# Patient Record
Sex: MALE | Race: WHITE | NOT HISPANIC OR LATINO | Employment: FULL TIME | URBAN - METROPOLITAN AREA
[De-identification: names, ages, dates, MRNs, and addresses within clinical notes are randomized per-mention and may not be internally consistent; named-entity substitution may affect disease eponyms.]

---

## 2018-10-19 ENCOUNTER — OFFICE VISIT (OUTPATIENT)
Dept: FAMILY MEDICINE | Facility: CLINIC | Age: 33
End: 2018-10-19

## 2018-10-19 VITALS
OXYGEN SATURATION: 97 % | HEART RATE: 51 BPM | BODY MASS INDEX: 26.55 KG/M2 | RESPIRATION RATE: 16 BRPM | HEIGHT: 72 IN | TEMPERATURE: 98 F | DIASTOLIC BLOOD PRESSURE: 70 MMHG | SYSTOLIC BLOOD PRESSURE: 108 MMHG | WEIGHT: 196 LBS

## 2018-10-19 DIAGNOSIS — Z13.1 SCREENING FOR DIABETES MELLITUS: ICD-10-CM

## 2018-10-19 DIAGNOSIS — Z13.220 SCREENING CHOLESTEROL LEVEL: ICD-10-CM

## 2018-10-19 DIAGNOSIS — Z00.01 ENCOUNTER FOR ROUTINE ADULT HEALTH EXAMINATION WITH ABNORMAL FINDINGS: Primary | ICD-10-CM

## 2018-10-19 DIAGNOSIS — Z30.9 ENCOUNTER FOR CONTRACEPTIVE MANAGEMENT, UNSPECIFIED TYPE: ICD-10-CM

## 2018-10-19 DIAGNOSIS — Z02.9 ADMINISTRATIVE ENCOUNTER: ICD-10-CM

## 2018-10-19 DIAGNOSIS — Z71.89 ACP (ADVANCE CARE PLANNING): ICD-10-CM

## 2018-10-19 DIAGNOSIS — Z76.89 HEALTH CARE HOME: ICD-10-CM

## 2018-10-19 LAB
GLUCOSE SERPL-MCNC: 86 MG/DL (ref 60–99)
HEMOGLOBIN: 16.1 G/DL (ref 13.3–17.7)

## 2018-10-19 PROCEDURE — 80061 LIPID PANEL: CPT | Mod: 90 | Performed by: FAMILY MEDICINE

## 2018-10-19 PROCEDURE — 82947 ASSAY GLUCOSE BLOOD QUANT: CPT | Performed by: FAMILY MEDICINE

## 2018-10-19 PROCEDURE — 85018 HEMOGLOBIN: CPT | Performed by: FAMILY MEDICINE

## 2018-10-19 PROCEDURE — 99385 PREV VISIT NEW AGE 18-39: CPT | Performed by: FAMILY MEDICINE

## 2018-10-19 PROCEDURE — 36415 COLL VENOUS BLD VENIPUNCTURE: CPT | Performed by: FAMILY MEDICINE

## 2018-10-19 NOTE — NURSING NOTE
Patient is here for a full physical exam. Patient was given healthcare directive today to fill out and turn in when done so we have on file.     Pre-Visit Screening :  Immunizations : up to date  Colon Screening : NA  Mammogram: NA  Asthma Action Test/Plan : No concerns  PHQ9 :  PHQ-2 done today   GAD7 :  No concerns  Patient's  BMI Body mass index is 26.96 kg/(m^2).  Questioned patient about current smoking habits.  Pt. has never smoked.  OK to leave a detailed voice message regarding today's visit Yes, phone # [phone]      ETOH screening:  Questions:  1-How often do you have a drink containing alcohol?                             2 times per week(s)  2-How many drinks containing alcohol do you have on a typical day when you are         Drinking?                              1 and 2   3- How often do you have 5 or more drinks on one occasion?                            Two times  per year

## 2018-10-19 NOTE — PROGRESS NOTES
"SUBJECTIVE:  The patient is in for an annual physical.  Patient is complaining of the following:  Needing an administrative exam for work incentives    PAST MEDICAL HISTORY:  Past Medical History:  No date: Broken collarbone  No date: Torn Achilles tendon    CURRENT MEDICATIONS:  No current outpatient prescriptions on file.    DRUG ALLERGIES:  Review of patient's allergies indicates no known allergies.    FAMILY HISTORY:    Family History    Skin Cancer Mother        SOCIAL HISTORY:  Social History   Marital status:   Spouse name: N/A    Years of education: N/A  Number of children: N/A   Occupational History  None on file   Social History Main Topics   Smoking status: Never Smoker    Smokeless tobacco: Never Used    Alcohol use Yes    Comment: occasional     Drug use: No    Sexual activity: Yes    Partners: Female    Birth control/ protection: None   Other Topics Concern   None on file   Social History Narrative       IMMUNIZATIONS:    Immunization History  Administered            Date(s) Administered   Influenza Intranasal Vaccine 4 valent                         10/11/2018     Tdap (Adacel,Boostrix)                         01/28/2010      REVIEW OF SYSTEMS:   Ears/Nose/Throat: negative   Eyes: Negative   Respiratory: negative   Cardiovascular: negative   Gastrointestinal: negative   Genitourinary: negative   Musculoskeletal: multiple injuries/ recent groin strain. A regular at [employer]     Neurologic: negative    Skin: negative    Psychiatric: excessive stress-work and 3 children/ youngest 2 weeks old    Hematologic/Lymphatic/Immunologic:  negative     Endocrine:  negative     OBJECTIVE:  VITALS:  /70 (BP Location: Left arm, Patient Position: Sitting, Cuff Size: Adult Regular)  Pulse 51  Temp 98  F (36.7  C) (Oral)  Ht 1.816 m (5' 11.5\")  Wt 88.9 kg (196 lb)  SpO2 97%  BMI 26.96 kg/m2  In general, this is a well developed, well nourished appearing male. Healthy.  HEENT:  Unremarkable; fundi are " within normal limits.  NECK: Supple; no adenopathy or thyromegaly.   LUNGS:  Clear to auscultation bilaterally.  HEART:  Regular rhythm and rate, normal S1 and S2, no evidence of a murmur.  ABDOMEN:  Bowel sounds are present throughout. The abdomen is soft and nontender, no hepatosplenomegaly or masses appreciated.  GENITALIA:  Appears to be normal male with bilaterally descended testes and a circumcised penis. Negative hernia  NEUROLOGICAL:  The exam is nonfocal. Deep tendon reflexes are symmetric  SKIN: Fair with freckles.    LAB: See Orders      ASSESSMENT:  (Z00.01) Encounter for routine adult health examination with abnormal findings  (primary encounter diagnosis)  Plan: VENIPUNC FNGR,HEEL,EAR [75668], HEMOGLOBIN         [67813.000]        Exercise encouraged  Fasting lipid profile obtained  Monitor size and characteristics of moles  Seat belt use encouraged  Sunscreen recommended  Testicular self-exam encouraged      (Z02.9) Administrative encounter  Plan: VENIPUNC FNGR,HEEL,EAR [55463], LIPID PANEL,         Glucose Fasting (BFP)        See form    (Z30.9) Encounter for contraceptive management, unspecified type  Plan: UROLOGY ADULT REFERRAL        contact Urology for vasectomy referral    (Z13.1) Screening for diabetes mellitus  Plan: VENIPUNC FNGR,HEEL,EAR [95118], Glucose Fasting        (BFP)            (Z13.220) Screening cholesterol leve  Plan: VENIPUNC FNGR,HEEL,EAR [02376], LIPID PANEL            (Z76.89) Health Care Home  Plan: I have reviewed the patient's medical history in detail and updated the computerized patient record.      (Z71.89) ACP (advance care planning)  Plan: I have reviewed the patient's medical history in detail and updated the computerized patient record.

## 2018-10-19 NOTE — MR AVS SNAPSHOT
After Visit Summary   10/19/2018    Zach Little    MRN: [id number]           Patient Information     Date Of Birth          1985        Visit Information        Provider Department      10/19/2018 11:30 AM Scherf, Anita E, MD [facility]        Today's Diagnoses     Encounter for routine adult health examination with abnormal findings    -  1    Administrative encounter        Encounter for contraceptive management, unspecified type        Screening for diabetes mellitus        Screening cholesterol level        Health Care Home        ACP (advance care planning)          Care Instructions    Exercise encouraged  Fasting lipid profile obtained  Monitor size and characteristics of moles  Seat belt use encouraged  Sunscreen recommended  Testicular self-exam encouraged            Follow-ups after your visit        Additional Services     UROLOGY ADULT REFERRAL       Your provider has referred you to: [facility]: [facility] [place] [phone]   [email or web address]    Please be aware that coverage of these services is subject to the terms and limitations of your health insurance plan.  Call member services at your health plan with any benefit or coverage questions.      Please bring the following with you to your appointment:    (1) Any X-Rays, CTs or MRIs which have been performed.  Contact the facility where they were done to arrange for  prior to your scheduled appointment.    (2) List of current medications  (3) This referral request   (4) Any documents/labs given to you for this referral                  Follow-up notes from your care team     Return in about 1 year (around 10/19/2019), or if symptoms worsen or fail to improve.      Who to contact     If you have questions or need follow up information about today's clinic visit or your schedule please contact [facility] directly at  "[phone].  Normal or non-critical lab and imaging results will be communicated to you by MyChart, letter or phone within 4 business days after the clinic has received the results. If you do not hear from us within 7 days, please contact the clinic through Leversensehart or phone. If you have a critical or abnormal lab result, we will notify you by phone as soon as possible.  Submit refill requests through Netechy or call your pharmacy and they will forward the refill request to us. Please allow 3 business days for your refill to be completed.          Additional Information About Your Visit        Leversensehart Information     [employer]y gives you secure access to your electronic health record. If you see a primary care provider, you can also send messages to your care team and make appointments. If you have questions, please call your primary care clinic.  If you do not have a primary care provider, please call [phone] and they will assist you.        Care EveryWhere ID     This is your Care EveryWhere ID. This could be used by other organizations to access your [facility] medical records  [id number]        Your Vitals Were     Pulse Temperature Respirations Height Pulse Oximetry BMI (Body Mass Index)    51 98  F (36.7  C) (Oral) 16 1.816 m (5' 11.5\") 97% 26.96 kg/m2       Blood Pressure from Last 3 Encounters:   10/19/18 108/70   08/25/16 100/68   01/29/16 94/58    Weight from Last 3 Encounters:   10/19/18 88.9 kg (196 lb)   08/25/16 88.9 kg (196 lb)   01/29/16 89.8 kg (198 lb)              We Performed the Following     Glucose Fasting (BFP)     HEMOGLOBIN [54712.000]     LIPID PANEL     UROLOGY ADULT REFERRAL     VENIPUNC FNGR,HEEL,EAR [[id number]]        Primary Care Provider Office Phone # Fax #    Anita Rodriguez [phone]       [address]  [address] [place] [address]        Equal Access to Services     BRISA PETERSON AH: Hadii aad ku hadasho Soomaali, waaxda luqadaha, qaybta stevenaldaisha mcadams, " joni sweettyler boone'aan ah. So [facility] [phone].    ATENCIÓN: Si habla español, tiene a garrett disposición servicios gratuitos de asistencia lingüística. Llstephan al [phone].    We comply with applicable federal civil rights laws and [place] laws. We do not discriminate on the basis of race, color, national origin, age, disability, sex, sexual orientation, or gender identity.            Thank you!     Thank you for choosing [facility]  for your care. Our goal is always to provide you with excellent care. Hearing back from our patients is one way we can continue to improve our services. Please take a few minutes to complete the written survey that you may receive in the mail after your visit with us. Thank you!             Your Updated Medication List - Protect others around you: Learn how to safely use, store and throw away your medicines at www.disposemymeds.org.      Notice  As of 10/19/2018 12:31 PM    You have not been prescribed any medications.

## 2018-10-19 NOTE — PATIENT INSTRUCTIONS
Exercise encouraged  Fasting lipid profile obtained  Monitor size and characteristics of moles  Seat belt use encouraged  Sunscreen recommended  Testicular self-exam encouraged    Welcome

## 2018-10-20 LAB
CHOLEST SERPL-MCNC: 219 MG/DL
CHOLEST/HDLC SERPL: 4.5 (CALC)
HDLC SERPL-MCNC: 49 MG/DL
LDLC SERPL CALC-MCNC: 155 MG/DL (CALC)
NONHDLC SERPL-MCNC: 170 MG/DL (CALC)
TRIGL SERPL-MCNC: 62 MG/DL

## 2018-11-09 ENCOUNTER — OFFICE VISIT (OUTPATIENT)
Dept: UROLOGY | Facility: CLINIC | Age: 33
End: 2018-11-09
Payer: COMMERCIAL

## 2018-11-09 VITALS
WEIGHT: 195 LBS | DIASTOLIC BLOOD PRESSURE: 68 MMHG | SYSTOLIC BLOOD PRESSURE: 104 MMHG | HEIGHT: 72 IN | BODY MASS INDEX: 26.41 KG/M2

## 2018-11-09 DIAGNOSIS — Z30.2 ENCOUNTER FOR STERILIZATION: Primary | ICD-10-CM

## 2018-11-09 PROCEDURE — 99202 OFFICE O/P NEW SF 15 MIN: CPT | Performed by: UROLOGY

## 2018-11-09 ASSESSMENT — PAIN SCALES - GENERAL: PAINLEVEL: NO PAIN (0)

## 2018-11-09 NOTE — MR AVS SNAPSHOT
After Visit Summary   11/9/2018    Zach Little    MRN: [id number]           Patient Information     Date Of Birth          1985        Visit Information        Provider Department      11/9/2018 8:00 AM Lul Cancino MD [facility] [place]        Care Instructions    [facility]  Vasectomy Information  [phone]    DATE OF PROCEDURE __________12/14_________________________    TIME OF PROCEDURE _________9:00__________________    TIME TO REPORT FOR PROCEDURE ____________8:45___________________    Your Physician:  _____ Isabel Mackay M.D.     _____ Terrence Luna M.D.     __x___ Lul Cancino M.D.    LOCATION OF PROCEDURE:     __x___ [facility]  _____ [address]   [address] [facility]  [address]    [place]   [address]    [place]   [address]    Preoperative Instructions:    _x____ You may have breakfast on the morning of your procedure.  If your procedure is    in the afternoon, you may have lunch as well.    _____ You must have someone drive you home after the procedure if you have been    prescribed an oral sedative (valium).    __x___ Do not take any aspirin, blood-thinning or anti-inflammatory medication for at    least 7-10 days before the procedure (this includes but is not limited to Advil,   Aleve, Ecotrin and Bufferin).      Postoperative Instructions Follow Vasectomy    Under routine circumstances, please note the following:    -No heavy lifting (over 15 lbs) for 48 hour.  -You may shower after 48 hours.  You may have a tub bath or use a swimming pool after one week postoperatively.  Your doctor will advise you if he feels it is helpful to soak in a bathtub postoperatively.  -Do not engage in intercourse for at least ten days and then proceed when comfortable.  -Wear an athletic supporter for 48 hours postoperatively or until any discomfort ceases.  -Your physician  "will instruct you regarding the use of ice in the recovery room or at home after the procedure, as necessary.  -Please remember as you resume your activity that you may experience some discomfor and/or swelling for the one to two weeks following the procedure.  If this occurs decrease activity and slightly elevate the scrotum (athletic supporter).  -As your stitches dissolve it may appear that the incision is \"gaping.\"  This is normal.    Necessary follow-up:  You do not need a follow up appointment unless you have problems after your procedure.  Please call our office at [phone] if you do.    At the time of your procedure you will be given the supplies for your post procedure follow up.  The test should be done AT LEAST THREE MONTHS AFTER your vasecomy.  During this time, be certain to maintain birth control measure!    Between the time of your procedure and the time that you have your first sperm count it is very important that you have a minimum of 30 ejaculations.  If you have any questions about this, please consult your physician.    If the sperm count that is done at three months is negative, you will be considered sterile.  Until, you are told that you are free to discontinue birth control you are considered fertile.    If your sperm counts reveal the presence of sperm, you will be advised to repeat the test until a negative result is obtained.     NOTE:  Please call our office one week after dropping off your sample for the result.  Test results will only be given to the patient.               Follow-ups after your visit        Your next 10 appointments already scheduled     Dec 14, 2018  9:00 AM CST   (Arrive by 8:45 AM)   Vasectomy with Lul Cancino MD,  CAUT EQ, UA VAS ROOM   [facility] ([facility])    [address]  [address]  [facility] [id number] [phone]              Who to contact     If you have questions or need " follow up information about today's clinic visit or your schedule please contact [facility] directly at [phone].  Normal or non-critical lab and imaging results will be communicated to you by MyChart, letter or phone within 4 business days after the clinic has received the results. If you do not hear from us within 7 days, please contact the clinic through MyChart or phone. If you have a critical or abnormal lab result, we will notify you by phone as soon as possible.  Submit refill requests through Everfi or call your pharmacy and they will forward the refill request to us. Please allow 3 business days for your refill to be completed.          Additional Information About Your Visit        [employer]Vis[employer]ard Information     Everfi gives you secure access to your electronic health record. If you see a primary care provider, you can also send messages to your care team and make appointments. If you have questions, please call your primary care clinic.  If you do not have a primary care provider, please call [phone] and they will assist you.        Care EveryWhere ID     This is your Care EveryWhere ID. This could be used by other organizations to access your [facility] medical records  [id number]        Your Vitals Were     Height BMI (Body Mass Index)                1.829 m (6') 26.45 kg/m2           Blood Pressure from Last 3 Encounters:   11/09/18 104/68   10/19/18 108/70   08/25/16 100/68    Weight from Last 3 Encounters:   11/09/18 88.5 kg (195 lb)   10/19/18 88.9 kg (196 lb)   08/25/16 88.9 kg (196 lb)              Today, you had the following     No orders found for display       Primary Care Provider Office Phone # Fax #    Anita Rodriguez [phone]       [facility] [address] [address]        Equal Access to Services     BRISA PETERSON : Jluis Carmen, rob newsome, florentin mcadams, joni redman  misha chaseaanicole ah. So [facility] [phone].    ATENCIÓN: Si habla armando, tiene a garrett disposición servicios gratuitos de asistencia lingüística. Llstephan al [phone].    We comply with applicable federal civil rights laws and [place] laws. We do not discriminate on the basis of race, color, national origin, age, disability, sex, sexual orientation, or gender identity.            Thank you!     Thank you for choosing [facility] [place]  for your care. Our goal is always to provide you with excellent care. Hearing back from our patients is one way we can continue to improve our services. Please take a few minutes to complete the written survey that you may receive in the mail after your visit with us. Thank you!             Your Updated Medication List - Protect others around you: Learn how to safely use, store and throw away your medicines at www.disposemymeds.org.      Notice  As of 11/9/2018  8:25 AM    You have not been prescribed any medications.

## 2018-11-09 NOTE — LETTER
11/9/2018       RE: Zach Little  [address] [address] [facility] [address]     Dear Colleague,    Thank you for referring your patient, Zach Little, to the [facility] [place] at [facility]. Please see a copy of my visit note below.    VASECTOMY CONSULTATION NOTE  DATE OF VISIT: 11/9/2018    PATIENT NAME: Zach Little    YOB: 1985      REASON FOR CONSULTATION: Mr. Zach Little is a 33 year old year old gentleman who came to the urology clinic today requesting a vasectomy. He has 3 children and he wishes to have a vasectomy for birth control. Ages 3, 2 and 6 weeks.    PAST MEDICAL HISTORY:   Past Medical History:   Diagnosis Date     Broken collarbone      Torn Achilles tendon        PAST SURGICAL HISTORY:   Past Surgical History:   Procedure Laterality Date     DENTAL SURGERY  2014    apicoectomy - root canal        MEDICATIONS: No current outpatient prescriptions on file.    ALLERGIES: No Known Allergies    FAMILY HISTORY:   Family History   Problem Relation Age of Onset     Skin Cancer Mother      basal cell carcinoma     Cancer Maternal Grandmother      uterine     Cancer Maternal Grandfather      basal cell carcinoma     Dementia Paternal Grandmother      Luey body     Breast Cancer Paternal Grandmother      Cerebrovascular Disease Paternal Grandfather      Lymphoma Paternal Grandfather        SOCIAL HISTORY:   Social History     Social History     Marital status:      Spouse name: N/A     Number of children: 3     Years of education: N/A     Occupational History     Not on file.     Social History Main Topics     Smoking status: Never Smoker     Smokeless tobacco: Never Used     Alcohol use Yes      Comment: occasional      Drug use: No     Sexual activity: Yes     Partners: Female     Birth control/ protection: None     Other Topics Concern     Not on file     Social History Narrative   Works in finance    HEIGHT:  "6' 0\"     WEIGHT: 195 lbs 0 oz   BP: 104/68    PULSE: Data Unavailable    EXAM: He is alert and oriented and well-appearing.  Examination of the scrotum reveals normal scrotal skin.  The testicles are normal to palpation bilaterally with no intratesticular lesions.  He has normally palpable vasa bilaterally.    DIAGNOSIS: Request for sterilization    PLAN: The risks of the procedure as well as expectations for recovery and outcomes were explained in detail to him.  He was counseled on the risks for bleeding infection and pain after the procedure. We discussed the risk of post-vasectomy pain syndrome.  He was instructed to continue to use contraception until he had proven azoospermia on a semen specimen.  This would normally be collected at least 3 months after the procedure. Also discussed the rare, but possible risk of re-canalization of the vas, even after successful vasectomy with sterile semen specimen.  He was instructed to hold all anticoagulants medications for one week prior to the procedure.  It was recommended that he have someone else drive him home after his vasectomy.  In light of these risks and expectations he would like to proceed.  We are scheduling a vasectomy in the office in the near future.    Lul Cancino MD  Urology  [facility]  Clinic Phone [phone]    Answers for HPI/ROS submitted by the patient on 10/29/2018   General Symptoms: No  Skin Symptoms: No  HENT Symptoms: No  EYE SYMPTOMS: No  HEART SYMPTOMS: No  LUNG SYMPTOMS: No  INTESTINAL SYMPTOMS: No  URINARY SYMPTOMS: No  REPRODUCTIVE SYMPTOMS: No  SKELETAL SYMPTOMS: No  BLOOD SYMPTOMS: No  NERVOUS SYSTEM SYMPTOMS: No  MENTAL HEALTH SYMPTOMS: No          "

## 2018-11-09 NOTE — PROGRESS NOTES
"VASECTOMY CONSULTATION NOTE  DATE OF VISIT: 11/9/2018    PATIENT NAME: Zach Little    YOB: 1985      REASON FOR CONSULTATION: Mr. Zach Little is a 33 year old year old gentleman who came to the urology clinic today requesting a vasectomy. He has 3 children and he wishes to have a vasectomy for birth control. Ages 3, 2 and 6 weeks.    PAST MEDICAL HISTORY:   Past Medical History:   Diagnosis Date     Broken collarbone      Torn Achilles tendon        PAST SURGICAL HISTORY:   Past Surgical History:   Procedure Laterality Date     DENTAL SURGERY  2014    apicoectomy - root canal        MEDICATIONS: No current outpatient prescriptions on file.    ALLERGIES: No Known Allergies    FAMILY HISTORY:   Family History   Problem Relation Age of Onset     Skin Cancer Mother      basal cell carcinoma     Cancer Maternal Grandmother      uterine     Cancer Maternal Grandfather      basal cell carcinoma     Dementia Paternal Grandmother      Luey body     Breast Cancer Paternal Grandmother      Cerebrovascular Disease Paternal Grandfather      Lymphoma Paternal Grandfather        SOCIAL HISTORY:   Social History     Social History     Marital status:      Spouse name: N/A     Number of children: 3     Years of education: N/A     Occupational History     Not on file.     Social History Main Topics     Smoking status: Never Smoker     Smokeless tobacco: Never Used     Alcohol use Yes      Comment: occasional      Drug use: No     Sexual activity: Yes     Partners: Female     Birth control/ protection: None     Other Topics Concern     Not on file     Social History Narrative   Works in finance    HEIGHT: 6' 0\"     WEIGHT: 195 lbs 0 oz   BP: 104/68    PULSE: Data Unavailable    EXAM: He is alert and oriented and well-appearing.  Examination of the scrotum reveals normal scrotal skin.  The testicles are normal to palpation bilaterally with no intratesticular lesions.  He has normally palpable vasa " bilaterally.    DIAGNOSIS: Request for sterilization    PLAN: The risks of the procedure as well as expectations for recovery and outcomes were explained in detail to him.  He was counseled on the risks for bleeding infection and pain after the procedure. We discussed the risk of post-vasectomy pain syndrome.  He was instructed to continue to use contraception until he had proven azoospermia on a semen specimen.  This would normally be collected at least 3 months after the procedure. Also discussed the rare, but possible risk of re-canalization of the vas, even after successful vasectomy with sterile semen specimen.  He was instructed to hold all anticoagulants medications for one week prior to the procedure.  It was recommended that he have someone else drive him home after his vasectomy.  In light of these risks and expectations he would like to proceed.  We are scheduling a vasectomy in the office in the near future.    Lul Cancino MD  Urology  [facility]  Clinic Phone [phone]    Answers for HPI/ROS submitted by the patient on 10/29/2018   General Symptoms: No  Skin Symptoms: No  HENT Symptoms: No  EYE SYMPTOMS: No  HEART SYMPTOMS: No  LUNG SYMPTOMS: No  INTESTINAL SYMPTOMS: No  URINARY SYMPTOMS: No  REPRODUCTIVE SYMPTOMS: No  SKELETAL SYMPTOMS: No  BLOOD SYMPTOMS: No  NERVOUS SYSTEM SYMPTOMS: No  MENTAL HEALTH SYMPTOMS: No

## 2018-11-09 NOTE — PATIENT INSTRUCTIONS
[facility] UROLOGY  Vasectomy Information  [phone]    DATE OF PROCEDURE __________12/14_________________________    TIME OF PROCEDURE _________9:00__________________    TIME TO REPORT FOR PROCEDURE ____________8:45___________________    Your Physician:  _____ Isabel Mackay M.D.     _____ Terrence Luna M.D.     __x___ Lul Cancino M.D.    LOCATION OF PROCEDURE:     __x___ [facility]  _____ [address]   [address]   [address] [facility]  [address]    [place]   [address]    [place]   [address]    Preoperative Instructions:    _x____ You may have breakfast on the morning of your procedure.  If your procedure is    in the afternoon, you may have lunch as well.    _____ You must have someone drive you home after the procedure if you have been    prescribed an oral sedative (valium).    __x___ Do not take any aspirin, blood-thinning or anti-inflammatory medication for at    least 7-10 days before the procedure (this includes but is not limited to Advil,   Aleve, Ecotrin and Bufferin).      Postoperative Instructions Follow Vasectomy    Under routine circumstances, please note the following:    -No heavy lifting (over 15 lbs) for 48 hour.  -You may shower after 48 hours.  You may have a tub bath or use a swimming pool after one week postoperatively.  Your doctor will advise you if he feels it is helpful to soak in a bathtub postoperatively.  -Do not engage in intercourse for at least ten days and then proceed when comfortable.  -Wear an athletic supporter for 48 hours postoperatively or until any discomfort ceases.  -Your physician will instruct you regarding the use of ice in the recovery room or at home after the procedure, as necessary.  -Please remember as you resume your activity that you may experience some discomfor and/or swelling for the one to two weeks following the procedure.  If this occurs decrease activity and slightly elevate the scrotum (athletic  "supporter).  -As your stitches dissolve it may appear that the incision is \"gaping.\"  This is normal.    Necessary follow-up:  You do not need a follow up appointment unless you have problems after your procedure.  Please call our office at [phone] if you do.    At the time of your procedure you will be given the supplies for your post procedure follow up.  The test should be done AT LEAST THREE MONTHS AFTER your vasecomy.  During this time, be certain to maintain birth control measure!    Between the time of your procedure and the time that you have your first sperm count it is very important that you have a minimum of 30 ejaculations.  If you have any questions about this, please consult your physician.    If the sperm count that is done at three months is negative, you will be considered sterile.  Until, you are told that you are free to discontinue birth control you are considered fertile.    If your sperm counts reveal the presence of sperm, you will be advised to repeat the test until a negative result is obtained.     NOTE:  Please call our office one week after dropping off your sample for the result.  Test results will only be given to the patient.       "

## 2018-11-09 NOTE — NURSING NOTE
Chief Complaint   Patient presents with     Consult     New pt is here for vasectomy consult.      Quita Gloria, CMA

## 2018-12-14 ENCOUNTER — OFFICE VISIT (OUTPATIENT)
Dept: UROLOGY | Facility: CLINIC | Age: 33
End: 2018-12-14
Payer: COMMERCIAL

## 2018-12-14 VITALS
WEIGHT: 195 LBS | HEIGHT: 72 IN | OXYGEN SATURATION: 97 % | SYSTOLIC BLOOD PRESSURE: 108 MMHG | HEART RATE: 52 BPM | BODY MASS INDEX: 26.41 KG/M2 | DIASTOLIC BLOOD PRESSURE: 70 MMHG

## 2018-12-14 DIAGNOSIS — Z30.2 ENCOUNTER FOR STERILIZATION: Primary | ICD-10-CM

## 2018-12-14 PROCEDURE — 55250 REMOVAL OF SPERM DUCT(S): CPT | Performed by: UROLOGY

## 2018-12-14 PROCEDURE — 88302 TISSUE EXAM BY PATHOLOGIST: CPT | Performed by: UROLOGY

## 2018-12-14 RX ORDER — HYDROCODONE BITARTRATE AND ACETAMINOPHEN 5; 325 MG/1; MG/1
1 TABLET ORAL EVERY 6 HOURS PRN
Qty: 5 TABLET | Refills: 0 | Status: SHIPPED | OUTPATIENT
Start: 2018-12-14 | End: 2021-03-12

## 2018-12-14 RX ORDER — LIDOCAINE HYDROCHLORIDE 20 MG/ML
20 INJECTION, SOLUTION INFILTRATION; PERINEURAL ONCE
Status: DISCONTINUED | OUTPATIENT
Start: 2018-12-14 | End: 2021-10-25

## 2018-12-14 ASSESSMENT — MIFFLIN-ST. JEOR: SCORE: 1867.51

## 2018-12-14 ASSESSMENT — PAIN SCALES - GENERAL
PAINLEVEL: NO PAIN (0)
PAINLEVEL: NO PAIN (0)

## 2018-12-14 NOTE — PROGRESS NOTES
OFFICE VASECTOMY OPERATIVE NOTE    DATE: 12/14/18  PATIENT: Zach Little    YOB: 1985    Zach Little is a 33 year old male.  He has 3 children and he wishes a vasectomy for birth control.  He has read the brochure and he has shaved himself.  I reviewed the vasectomy procedure with him explaining that it would be done with a local anesthetic given just in the location where the vasectomy would be done.  It would be done through incisions with the removal of segments of the vasa, cauterization of the ends, and burying the ends separate with sutures.      Pt. Understands:  -1/1000-1/3000 risk of future pregnancy even with perfectly done vasectomy  -vasectomy is a permanent procedure    -he may cryopreserve sperm if he wishes   -1-5% risk of post-vasectomy pain syndrome   -1-5% risk of complication, primarily infection or bleeding  - he needs to have a semen sample that shows no sperm before getting approval for unprotected intercourse.      Complications such as bleeding, infection, and damage to other tissues in the area were discussed. I recommended that an ice bag be placed on the scrotum off and on tonight to help reduce pain and swelling.      He was reminded that he was not sterile immediately after the vasectomy that it would take at least 20 ejaculations to empty the vas of any remaining sperm.  He was not to provide a semen sample until after the 20th ejaculation and not before 12 weeks after the vas. He was  to fulfill both of those requirements.   He understands it is his responsibility to find out the results of the vas before proceeding with intercourse without birth control protection.  Other items discussed were activity afterwards, returning to work, voluntary physical activity,  resuming sexual activity, clothing to wear, bathing, and care of the vas site and expected changes in the site as healing progresses.  After signing the permit, bilateral vasectomy was done as described  below.     ANESTHESIA: Local    DETAILS OF PROCEDURE: The risks of the procedure were explained in detail to the patient and informed consent was obtained. The patient was placed supine on the procedure table and the penis and scrotum were prepped and draped in the standard sterile fashion. The right vas deferens was isolated and brought up to the skin. 1% lidocaine local anesthesia was used to infiltrate the skin and the spermatic cord. A small incision was created and adventitial tissues were swept away from the vas. A 1 cm segment of the vas was excised and sent for pathology. The proximal and distal lumina of the vas were cauterized and then each segment was tied off in a knuckling-fashion with a 3-0 vicryl suture. Hemostasis was ensured and the segments were released back into the scrotum. Meticulous hemostasis was achieved. Next the left vas was brought to the skin and a vasectomy was performed in the similar fashion. At the end of the procedure a single 3-0 chromic suture was placed in the skin.     COMPLICATIONS: None    TAKE HOME MEDICATIONS: Norco, 1-2 tabs every 6 hours, PRN    DISMISSAL INSTRUCTIONS:  - Ice pack to scrotum 15 to 20 minutes each hour awake for 36 to 40 hours.  - No strenuous activity or ejaculation for 14 days.  - No unprotected sexual activity until proven azoospermia on semen samples at 3 months.  - Referred to patient handout for normal postop expectations and indications to contact nurse or physician.    M.D.: Lul Cancino M.D.

## 2018-12-14 NOTE — PATIENT INSTRUCTIONS
POST VASECTOMY INSTRUCTIONS    1.) If you have any concerns or questions, please contact our office at [phone].     2.) It is okay to take a shower, however, do not soak in water (bath,swimming, hot tub,etc....) until your incision is healed.    3.) You might notice some swelling, mild bruising, and discomfort for several days after your vasectomy. This is to be expected. For at least the next 24 hours, an ice pack should be applied for 20 minutes every hour that you are awake. Ice will help with discomfort and swelling. Do not place directly on the skin.    4.) No intercourse, strenuous activity or exercise for at least 7-10 days, even if you feel fine.    5.) You need to wear good scrotal support while you are healing. We strongly recommend an athletic supporter or a pair of regular briefs that are one size too small. Boxer briefs do not offer enough support.    6.) Tylenol as directed on the bottle is preferred for discomfort. Please avoid any blood thinning products such as ibuprofen and aspirin (Motrin, Advil, Excedrin, Aleve, ect..) for at least the next week.    7.) It is normal to have mild drainage from the incision area for several days. However, please contact our office if you notice: bright red blood that does not stop after three days, increased pain, heat at the incision, red streaks, foul smelling discharge, or if you start to run a fever.     8.) YOU MUST CONTINUE BIRTH CONTROL UNTIL WE CONFIRM YOUR STERILITY.  This process can take up to a year to complete (rare occurrence).     9.) You have been given a form with specimen cup and instructions for your follow up specimen. You will be cleared once we receive ONE negative specimen. If your specimen comes back positive (sperm seen) you will be asked to repeat the test. This does not mean that your vasectomy has failed.

## 2018-12-14 NOTE — NURSING NOTE
Chief Complaint   Patient presents with     Sterilization     Pt here for in office vasectomy     The patient comes in today for a vasectomy.  The patient and his significant other have previously been in and we discussed the other options for birth control, the risks and benefits of the procedure.  Details of those risks and benefits have been noted in the consent form which has been signed.  This includes the potential for bleeding, potential for infection.  Data presented suggesting an increased risk of prostate carcinoma and potential for sperm granuloma formation.    The patient was placed in a supine position on the procedure table.  Prep was done by the patient at home.  Sterilely prepped and draped in the usual fashion.  The left vas was first identified and brought up to the midline raphae where a small wheal of Lidocaine with epinephrine was placed in the skin overlying the vas and further anesthesia was injected in to the vas sheath.  The no-scalpel dissecting instrument was then used to puncture the skin and dissect out the vas free of adventitial tissue.  The no-scalpel vas clamp was then used to grasp the vas.  Skin bleeders were cauterized with electrocautery as necessary.  When a segment of vas was clearly freed up, a .5-1 cm. segment of the vas was then removed and the proximal and distal ends were ligated with 4-0 Vicryl.  The distal end was then buried with pursestring suture.  Any bleeders were then cauterized and/or ligated to control hemorrhage.  When the sterile field was completely dry, it was returned to the scrotal sac.      Attention was then turned to the opposite side and proceeded in similar fashion to obtain specimen.  The vas was brought up to the same opening, injected with Lidocaine along the vas sheath and grasped with the clamp, and proceeded in the above fashion.  A 3-0 Chromic suture was used for the scrotal skin incision and a single interrupted suture was used to  re-approximate the skin edges.    ASSESSMENT:  1)  Male sterilization via vasectomy.     PLAN:  Patient tolerated the procedure quite well.  He will use Ibuprofen 800 mg. p.o. t.i.d. with food.  Ice to the scrotum 30 minutes at a time every two to three hours for the next two to three days.  No heavy lifting for three days.  The patient will bring in a sample of semen for analysis after 10-12 ejaculations.      Debbie Hamilton, CMA

## 2018-12-14 NOTE — LETTER
12/14/2018       RE: Zach Little  [address] [place] [address]     Dear Colleague,    Thank you for referring your patient, Zach Little, to the [facility] [place] at [facility]. Please see a copy of my visit note below.    OFFICE VASECTOMY OPERATIVE NOTE    DATE: 12/14/18  PATIENT: Zach Little    YOB: 1985    Zach Little is a 33 year old male.  He has 3 children and he wishes a vasectomy for birth control.  He has read the brochure and he has shaved himself.  I reviewed the vasectomy procedure with him explaining that it would be done with a local anesthetic given just in the location where the vasectomy would be done.  It would be done through incisions with the removal of segments of the vasa, cauterization of the ends, and burying the ends separate with sutures.      Pt. Understands:  -1/1000-1/3000 risk of future pregnancy even with perfectly done vasectomy  -vasectomy is a permanent procedure    -he may cryopreserve sperm if he wishes   -1-5% risk of post-vasectomy pain syndrome   -1-5% risk of complication, primarily infection or bleeding  - he needs to have a semen sample that shows no sperm before getting approval for unprotected intercourse.      Complications such as bleeding, infection, and damage to other tissues in the area were discussed. I recommended that an ice bag be placed on the scrotum off and on tonight to help reduce pain and swelling.      He was reminded that he was not sterile immediately after the vasectomy that it would take at least 20 ejaculations to empty the vas of any remaining sperm.  He was not to provide a semen sample until after the 20th ejaculation and not before 12 weeks after the vas. He was  to fulfill both of those requirements.   He understands it is his responsibility to find out the results of the vas before proceeding with intercourse without birth control  protection.  Other items discussed were activity afterwards, returning to work, voluntary physical activity,  resuming sexual activity, clothing to wear, bathing, and care of the vas site and expected changes in the site as healing progresses.  After signing the permit, bilateral vasectomy was done as described below.     ANESTHESIA: Local    DETAILS OF PROCEDURE: The risks of the procedure were explained in detail to the patient and informed consent was obtained. The patient was placed supine on the procedure table and the penis and scrotum were prepped and draped in the standard sterile fashion. The right vas deferens was isolated and brought up to the skin. 1% lidocaine local anesthesia was used to infiltrate the skin and the spermatic cord. A small incision was created and adventitial tissues were swept away from the vas. A 1 cm segment of the vas was excised and sent for pathology. The proximal and distal lumina of the vas were cauterized and then each segment was tied off in a knuckling-fashion with a 3-0 vicryl suture. Hemostasis was ensured and the segments were released back into the scrotum. Meticulous hemostasis was achieved. Next the left vas was brought to the skin and a vasectomy was performed in the similar fashion. At the end of the procedure a single 3-0 chromic suture was placed in the skin.     COMPLICATIONS: None    TAKE HOME MEDICATIONS: Norco, 1-2 tabs every 6 hours, PRN    DISMISSAL INSTRUCTIONS:  - Ice pack to scrotum 15 to 20 minutes each hour awake for 36 to 40 hours.  - No strenuous activity or ejaculation for 14 days.  - No unprotected sexual activity until proven azoospermia on semen samples at 3 months.  - Referred to patient handout for normal postop expectations and indications to contact nurse or physician.      M.D.: Lul Cancino M.D.

## 2018-12-17 LAB — COPATH REPORT: NORMAL

## 2019-06-10 DIAGNOSIS — Z30.2 ENCOUNTER FOR STERILIZATION: ICD-10-CM

## 2019-06-10 LAB — SEMEN ANALYSIS P VAS PNL: NORMAL

## 2019-06-10 PROCEDURE — 89321 SEMEN ANAL SPERM DETECTION: CPT | Performed by: UROLOGY

## 2019-06-11 ENCOUNTER — TELEPHONE (OUTPATIENT)
Dept: UROLOGY | Facility: CLINIC | Age: 34
End: 2019-06-11

## 2019-06-11 NOTE — TELEPHONE ENCOUNTER
Lul Cancino MD [facility] Nurse-Tyesha             Please call Zach and let him know there were no sperm seen on his semen analysis.     Thanks!   Will    Called and gave him the results.  [facility]  June 11, 2019 10:32 AM

## 2020-03-10 ENCOUNTER — HEALTH MAINTENANCE LETTER (OUTPATIENT)
Age: 35
End: 2020-03-10

## 2020-08-19 ENCOUNTER — NURSE TRIAGE (OUTPATIENT)
Dept: NURSING | Facility: CLINIC | Age: 35
End: 2020-08-19

## 2020-08-19 NOTE — TELEPHONE ENCOUNTER
Caller called to find out about the result of COVID-19 antibody test that was done.  Amy Sanabria RN    Additional Information    Negative: [1] Caller is not with the adult (patient) AND [2] reporting urgent symptoms    Negative: Lab result questions    Negative: Medication questions    Negative: Caller can't be reached by phone    Negative: Caller has already spoken to PCP or another triager    Negative: RN needs further essential information from caller in order to complete triage    Negative: Requesting regular office appointment    Negative: [1] Caller requesting NON-URGENT health information AND [2] PCP's office is the best resource    Negative: Health Information question, no triage required and triager able to answer question    General information question, no triage required and triager able to answer question    Protocols used: INFORMATION ONLY CALL-A-

## 2020-12-27 ENCOUNTER — HEALTH MAINTENANCE LETTER (OUTPATIENT)
Age: 35
End: 2020-12-27

## 2021-03-12 ENCOUNTER — OFFICE VISIT (OUTPATIENT)
Dept: FAMILY MEDICINE | Facility: CLINIC | Age: 36
End: 2021-03-12

## 2021-03-12 VITALS
RESPIRATION RATE: 20 BRPM | OXYGEN SATURATION: 97 % | HEART RATE: 54 BPM | SYSTOLIC BLOOD PRESSURE: 122 MMHG | HEIGHT: 72 IN | BODY MASS INDEX: 26.14 KG/M2 | DIASTOLIC BLOOD PRESSURE: 70 MMHG | WEIGHT: 193 LBS | TEMPERATURE: 97.9 F

## 2021-03-12 DIAGNOSIS — R07.0 THROAT PAIN: Primary | ICD-10-CM

## 2021-03-12 LAB — S PYO AG THROAT QL IA.RAPID: NORMAL

## 2021-03-12 PROCEDURE — 87880 STREP A ASSAY W/OPTIC: CPT | Performed by: FAMILY MEDICINE

## 2021-03-12 PROCEDURE — 87070 CULTURE OTHR SPECIMN AEROBIC: CPT | Performed by: FAMILY MEDICINE

## 2021-03-12 PROCEDURE — 99213 OFFICE O/P EST LOW 20 MIN: CPT | Performed by: FAMILY MEDICINE

## 2021-03-12 SDOH — ECONOMIC STABILITY: INCOME INSECURITY: HOW HARD IS IT FOR YOU TO PAY FOR THE VERY BASICS LIKE FOOD, HOUSING, MEDICAL CARE, AND HEATING?: NOT HARD AT ALL

## 2021-03-12 SDOH — ECONOMIC STABILITY: TRANSPORTATION INSECURITY
IN THE PAST 12 MONTHS, HAS THE LACK OF TRANSPORTATION KEPT YOU FROM MEDICAL APPOINTMENTS OR FROM GETTING MEDICATIONS?: NO

## 2021-03-12 SDOH — ECONOMIC STABILITY: TRANSPORTATION INSECURITY
IN THE PAST 12 MONTHS, HAS LACK OF TRANSPORTATION KEPT YOU FROM MEETINGS, WORK, OR FROM GETTING THINGS NEEDED FOR DAILY LIVING?: NO

## 2021-03-12 SDOH — ECONOMIC STABILITY: FOOD INSECURITY: WITHIN THE PAST 12 MONTHS, YOU WORRIED THAT YOUR FOOD WOULD RUN OUT BEFORE YOU GOT MONEY TO BUY MORE.: NEVER TRUE

## 2021-03-12 SDOH — ECONOMIC STABILITY: FOOD INSECURITY: WITHIN THE PAST 12 MONTHS, THE FOOD YOU BOUGHT JUST DIDN'T LAST AND YOU DIDN'T HAVE MONEY TO GET MORE.: NEVER TRUE

## 2021-03-12 ASSESSMENT — MIFFLIN-ST. JEOR: SCORE: 1848.44

## 2021-03-12 NOTE — PROGRESS NOTES
SUBJECTIVE:  Zach Little is an 35 year old male who presents for evaluation and treatment   of sore throat. Symptoms include sore throat. Onset 4   days, waxing and waning since that time. Known Strep exposure:   none and wife also has a sore throat.  exposed to young nieces and nephews with colds this past weekend  No fever, congestion, change in taste or smell    No current outpatient medications on file.     Current Facility-Administered Medications   Medication     lidocaine 2% injection (MDV)     No Known Allergies    Social History     Tobacco Use     Smoking status: Never Smoker     Smokeless tobacco: Never Used   Substance Use Topics     Alcohol use: Yes     Comment: occasional        OBJECTIVE:  /70 (BP Location: Right arm, Patient Position: Sitting, Cuff Size: Adult Large)   Pulse 54   Temp 97.9  F (36.6  C) (Oral)   Ht 1.829 m (6')   Wt 87.5 kg (193 lb)   SpO2 97%   BMI 26.18 kg/m    General appearance: healthy, alert, no distress, cooperative and smiling  Ears: R TM - normal: no effusions, no erythema, and normal landmarks, L TM - normal: no effusions, no erythema, and normal landmarks  Nose: clear discharge  Oropharynx: mild erythema, no tonsillar hypertrophy, no exudates present, throat culture taken, rapid strep done and post nasal drainage  Neck: normal, supple and no adenopathy  Lungs: normal and clear to auscultation  Heart: regular rate and rhythm and no murmurs, clicks, or gallops    ASSESSMENT:  Acute pharyngitis - r/o Strep      RSS negative; await throat culture results.    Dx:  Non-strep pharyngitis    Rx:  1)  Symptomatic treatment with fluids, vaporizer, acetaminophen.  2)  Recheck as needed for persistence, worsening, appearance of new   symptoms.  3)  Consider CoVid testing/ will go to 24 hour testing facility for [facility] today to have results for going back to work.    PLAN:  Discussed possible etiologies of symptoms and need for antibiotic   treatment if Strep  found.    Total time spent with patient today including visit and non face to face time 20 minutes.

## 2021-03-12 NOTE — PATIENT INSTRUCTIONS
RSS negative; await throat culture results.    Dx:  Non-strep pharyngitis    Rx:  1)  Symptomatic treatment with fluids, vaporizer, acetaminophen.  2)  Recheck as needed for persistence, worsening, appearance of new   symptoms.  3)  Consider CoVid testing/ will go to 24 hour testing facility for [facility] today to have results for going back to work.    PLAN:  Discussed possible etiologies of symptoms and need for antibiotic   treatment if Strep found.

## 2021-03-12 NOTE — NURSING NOTE
Zach is here today for a sore throat.    Pre-visit Screening:  Immunizations:  up to date  Colonoscopy:  NA  Mammogram: NA  Asthma Action Test/Plan:  NA  PHQ9:  NA  GAD7:  NA  Questioned patient about current smoking habits Pt. has never smoked.  Ok to leave detailed message on voice mail for today's visit only Yes, phone # [phone]

## 2021-03-15 LAB — THROAT CULTURE: NORMAL

## 2021-03-23 NOTE — NURSING NOTE
The following medication was given:     MEDICATION:  Lidocaine 2% Soln  ROUTE: vas deferens  SITE: vas deferens  DOSE: 20mL  LOT #: [id number]  : Theo  EXPIRATION DATE: 03/2020  NDC#: 32580-188-41   Was there drug waste? No  Multi-dose vial: Ghislaine Hamilton CMA  December 14, 2018   Cyclosporine Counseling:  I discussed with the patient the risks of cyclosporine including but not limited to hypertension, gingival hyperplasia,myelosuppression, immunosuppression, liver damage, kidney damage, neurotoxicity, lymphoma, and serious infections. The patient understands that monitoring is required including baseline blood pressure, CBC, CMP, lipid panel and uric acid, and then 1-2 times monthly CMP and blood pressure.

## 2021-04-24 ENCOUNTER — HEALTH MAINTENANCE LETTER (OUTPATIENT)
Age: 36
End: 2021-04-24

## 2021-10-04 ENCOUNTER — HEALTH MAINTENANCE LETTER (OUTPATIENT)
Age: 36
End: 2021-10-04

## 2021-10-25 ENCOUNTER — OFFICE VISIT (OUTPATIENT)
Dept: FAMILY MEDICINE | Facility: CLINIC | Age: 36
End: 2021-10-25

## 2021-10-25 VITALS
OXYGEN SATURATION: 98 % | TEMPERATURE: 97.9 F | SYSTOLIC BLOOD PRESSURE: 118 MMHG | WEIGHT: 193 LBS | RESPIRATION RATE: 20 BRPM | DIASTOLIC BLOOD PRESSURE: 78 MMHG | BODY MASS INDEX: 26.14 KG/M2 | HEIGHT: 72 IN | HEART RATE: 48 BPM

## 2021-10-25 DIAGNOSIS — Z01.818 PRE-OPERATIVE GENERAL PHYSICAL EXAMINATION: ICD-10-CM

## 2021-10-25 DIAGNOSIS — S93.332A SUBLUXATION OF PERONEAL TENDON OF LEFT FOOT: Primary | ICD-10-CM

## 2021-10-25 PROCEDURE — 90686 IIV4 VACC NO PRSV 0.5 ML IM: CPT | Performed by: STUDENT IN AN ORGANIZED HEALTH CARE EDUCATION/TRAINING PROGRAM

## 2021-10-25 PROCEDURE — 90471 IMMUNIZATION ADMIN: CPT | Performed by: STUDENT IN AN ORGANIZED HEALTH CARE EDUCATION/TRAINING PROGRAM

## 2021-10-25 PROCEDURE — 99214 OFFICE O/P EST MOD 30 MIN: CPT | Mod: 25 | Performed by: STUDENT IN AN ORGANIZED HEALTH CARE EDUCATION/TRAINING PROGRAM

## 2021-10-25 ASSESSMENT — MIFFLIN-ST. JEOR: SCORE: 1848.44

## 2021-10-25 NOTE — PROGRESS NOTES
[facility]  [address]  [address]  [facility] [address]  Phone: [phone]  Fax: [phone]  Primary Provider: Anita Rodriguez  Pre-op Performing Provider: HAMIDA ELIZALDE    PREOPERATIVE EVALUATION:  Today's date: 10/25/2021    Zach Little is a 35 year old male who presents for a preoperative evaluation.    Surgical Information:  Surgery/Procedure: Peroneal repair, roof deepening, left  Surgery Location: [facility]  Surgeon: Dr DEMARCO Bourne  Surgery Date: 11/3/2021  Time of Surgery: TBD  Where patient plans to recover: At home with family  Fax number for surgical facility: [phone]    Type of Anesthesia Anticipated: to be determined    Assessment & Plan     The proposed surgical procedure is considered INTERMEDIATE risk.    Pre-operative general physical examination  Subluxation of peroneal tendon of left foot    Risks and Recommendations:  The patient has the following additional risks and recommendations for perioperative complications:   - No identified additional risk factors other than previously addressed    Medication Instructions:  Patient is on no chronic medications    RECOMMENDATION:  APPROVAL GIVEN to proceed with proposed procedure, without further diagnostic evaluation.    Hamida Elizalde MD, [facility]       Subjective     HPI related to upcoming procedure: recurrent lateral ankle sprains with new peroneal subluxation    1. No - Have you ever had a heart attack or stroke?  2. No - Have you ever had surgery on your heart or blood vessels, such as a stent, coronary (heart) bypass, or surgery on an artery in the head, neck, heart, or legs?  3. No - Do you have chest pain when you are physically active?  4. No - Do you have a history of heart failure?  5. No - Do you currently have a cold, bronchitis, or symptoms of other respiratory (head and chest) infections?  6. No - Do you have a cough, shortness of breath, or wheezing?  7. No - Do you  or anyone in your family have a history of blood clots?  8. No - Do you or anyone in your family have a serious bleeding problem, such as long-lasting bleeding after surgeries or cuts?  9. No - Have you ever had anemia or been told to take iron pills?  10. No - Have you had any abnormal blood loss such as black, tarry or bloody stools, or abnormal vaginal bleeding?  11. No - Have you ever had a blood transfusion?  12. Yes - Are you willing to have a blood transfusion if it is medically needed before, during, or after your surgery?  13. No - Have you or anyone in your family ever had problems with anesthesia (sedation for surgery)?  14. No - Do you have sleep apnea, excessive snoring, or daytime drowsiness?   15. No - Do you have any artifical heart valves or other implanted medical devices, such as a pacemaker, defibrillator, or continuous glucose monitor?  16. No - Do you have any artifical joints?  17. No - Are you allergic to latex?    Health Care Directive:  Patient does not have a Health Care Directive or Living Will: Discussed advance care planning with patient; information given to patient to review.    Review of Systems  Constitutional, neuro, ENT, endocrine, pulmonary, cardiac, gastrointestinal, genitourinary, musculoskeletal, integument and psychiatric systems are negative, except as otherwise noted.    Patient Active Problem List    Diagnosis Date Noted     ACP (advance care planning) 10/19/2018     Priority: Medium     Patient was given a copy of healthcare directive to fill out and turn back into clinic when finished so that we have on file.        Health Care Home 10/19/2018     Priority: Medium     Hip pain 02/05/2016     Priority: Medium     Left hip pain 01/29/2016     Priority: Medium      Past Medical History:   Diagnosis Date     Broken collarbone      Torn Achilles tendon     partial     Past Surgical History:   Procedure Laterality Date     DENTAL SURGERY  2014    apicoectomy - root canal       No current outpatient medications on file.       No Known Allergies     Social History     Tobacco Use     Smoking status: Never Smoker     Smokeless tobacco: Never Used   Substance Use Topics     Alcohol use: Yes     Comment: occasional      Family History   Problem Relation Age of Onset     Skin Cancer Mother         basal cell carcinoma     Cancer Maternal Grandmother         uterine     Cancer Maternal Grandfather         basal cell carcinoma     Dementia Paternal Grandmother         Luey body     Breast Cancer Paternal Grandmother      Cerebrovascular Disease Paternal Grandfather      Lymphoma Paternal Grandfather      Valvular heart disease Paternal Grandfather      History   Drug Use No         Objective     /78 (BP Location: Left arm, Patient Position: Sitting, Cuff Size: Adult Regular)   Pulse (!) 48   Temp 97.9  F (36.6  C) (Oral)   Resp 20   Ht 1.829 m (6')   Wt 87.5 kg (193 lb)   SpO2 98%   BMI 26.18 kg/m      Physical Exam    GENERAL APPEARANCE: healthy, alert and no distress     EYES: EOMI,  PERRL     HENT: ear canals and TM's normal and nose and mouth without ulcers or lesions     NECK: no adenopathy, no asymmetry, masses, or scars and thyroid normal to palpation     RESP: lungs clear to auscultation - no rales, rhonchi or wheezes     CV: regular rates and rhythm, normal S1 S2, no S3 or S4 and no murmur, click or rub     ABDOMEN:  soft, nontender, no HSM or masses and bowel sounds normal     MS: extremities normal- no gross deformities noted, no evidence of inflammation in joints, FROM in all extremities.     SKIN: no suspicious lesions or rashes     NEURO: Normal strength and tone, sensory exam grossly normal, mentation intact and speech normal     PSYCH: mentation appears normal. and affect normal/bright     LYMPHATICS: No cervical adenopathy    No results for input(s): HGB, PLT, INR, NA, POTASSIUM, CR, A1C in the last 87911 hours.     Diagnostics:  No labs were ordered during this  visit.   No EKG required, no history of coronary heart disease, significant arrhythmia, peripheral arterial disease or other structural heart disease.    Revised Cardiac Risk Index (RCRI):  The patient has the following serious cardiovascular risks for perioperative complications:   - No serious cardiac risks = 0 points     RCRI Interpretation: 0 points: Class I (very low risk - 0.4% complication rate)       Signed Electronically by: Cooper Elizalde MD  Copy of this evaluation report is provided to requesting physician.

## 2021-10-25 NOTE — NURSING NOTE
Chief Complaint   Patient presents with     Pre-Op Exam     11/3/2021, Dr. DEMARCO Bourne, left ankle peroneal renaticular repair and groove deepening, [facility]

## 2022-05-12 ENCOUNTER — OFFICE VISIT (OUTPATIENT)
Dept: FAMILY MEDICINE | Facility: CLINIC | Age: 37
End: 2022-05-12

## 2022-05-12 VITALS
TEMPERATURE: 98.7 F | DIASTOLIC BLOOD PRESSURE: 72 MMHG | WEIGHT: 193 LBS | SYSTOLIC BLOOD PRESSURE: 120 MMHG | HEIGHT: 72 IN | BODY MASS INDEX: 26.14 KG/M2 | HEART RATE: 61 BPM | OXYGEN SATURATION: 97 %

## 2022-05-12 DIAGNOSIS — J30.2 SEASONAL ALLERGIC RHINITIS, UNSPECIFIED TRIGGER: ICD-10-CM

## 2022-05-12 DIAGNOSIS — R07.0 THROAT PAIN: Primary | ICD-10-CM

## 2022-05-12 LAB
COVID-19: NEGATIVE
STREP A: NEGATIVE

## 2022-05-12 PROCEDURE — 87651 STREP A DNA AMP PROBE: CPT | Performed by: PHYSICIAN ASSISTANT

## 2022-05-12 PROCEDURE — 87635 SARS-COV-2 COVID-19 AMP PRB: CPT | Performed by: PHYSICIAN ASSISTANT

## 2022-05-12 PROCEDURE — 99213 OFFICE O/P EST LOW 20 MIN: CPT | Performed by: PHYSICIAN ASSISTANT

## 2022-05-12 PROCEDURE — G2023 SPECIMEN COLLECT COVID-19: HCPCS | Performed by: PHYSICIAN ASSISTANT

## 2022-05-12 NOTE — PROGRESS NOTES
Assessment & Plan     Throat pain  - Strep A ([facility])-NEGATIVE  - COVID-19 ([facility])-NEGATIVE    Seasonal allergic rhinitis, unspecified trigger-hard to tell if this is viral vs allergy, favoring viral given other sickness in the house but will treat for allergies to see if this helps symptoms  Start Allegra 1/day for at least 3 weeks-reminded to take daily  Flonase 2 puffs in each nostril 1/day during allergy season  Chloraseptic spray for throat pain  Start ibuprofen 400mg every 6-8hrs  If viral, educated that symptoms should resolve within 7-10 days with symptomatic relief only  Increase hydration while feeling ill    Follow up as needed    No follow-ups on file.    Farhan Ladd PA-C  [facility]    Colleen Serrano is a 36 year old who presents for the following health issues     HPI     Acute Illness  Acute illness concerns: Sore throat-has come and gone, lasted 6 days, gone for 2 days, now back  Onset/Duration: 2 weeks  Symptoms:  Fever: no  Chills/Sweats: no  Headache (location?): YES-frontal  Sinus Pressure: YES  Conjunctivitis:  no  Ear Pain: YES- left sided  Rhinorrhea: YES  Congestion: YES  Sore Throat: YES-worse at night and in morning, better during day  Cough: YES  Wheeze: no  Decreased Appetite: no  Nausea: no  Vomiting: no  Diarrhea: no  Dysuria/Freq.: no  Dysuria or Hematuria: no  Fatigue/Achiness: YES  Sick/Strep Exposure: YES-POSITIVE COVID EXPOSURE FRIDAY  Therapies tried and outcome: Tylenol for headache, Claritin for allergies (every other day)    Negative COVID test Tuesday. Symptoms haven't changed since this negative test.  Allergies severe around this time of the year.    Of note, wife and child have had similar sore throats in the last few weeks.    Review of Systems   Constitutional, HEENT, cardiovascular, pulmonary, gi and gu systems are negative, except as otherwise noted.      Objective    /72 (BP Location: Left arm, Patient Position: Sitting, Cuff Size: Adult Large)    Pulse 61   Temp 98.7  F (37.1  C) (Temporal)   Ht 1.829 m (6')   Wt 87.5 kg (193 lb)   SpO2 97%   BMI 26.18 kg/m    Body mass index is 26.18 kg/m .  Physical Exam   GENERAL: healthy, alert and no distress  HENT: ear canals and TM's normal, nose and mouth without ulcers or lesions, oropharynx non-erythematous, some post nasal drip noted  NECK: bilateral anterior cervical adenopathy, no asymmetry, masses, or scars and thyroid normal to palpation  RESP: lungs clear to auscultation - no rales, rhonchi or wheezes  CV: regular rate and rhythm, normal S1 S2, no S3 or S4, no murmur, click or rub, no peripheral edema and peripheral pulses strong  ABDOMEN: soft, nontender, no hepatosplenomegaly, no masses and bowel sounds normal  MS: no gross musculoskeletal defects noted, no edema  SKIN: no suspicious lesions or rashes  NEURO: Normal strength and tone, mentation intact and speech normal    Results for orders placed or performed in visit on 05/12/22 (from the past 24 hour(s))   Strep A ([facility])   Result Value Ref Range    STREP A Negative Negative   COVID-19 ([facility])   Result Value Ref Range    COVID-19 Negative

## 2022-05-12 NOTE — NURSING NOTE
Chief Complaint   Patient presents with     URI     Sore throat started a few weeks ago, went away and returned last Saturday, painful swallowing, some post-nasal drip, productive cough, NEG at home covid test on Tuesday      Pre-visit Screening:  Immunizations:  up to date  Colonoscopy:  NA  Mammogram: NA  Asthma Action Test/Plan:  NA  PHQ9:  NA  GAD7:  NA  Questioned patient about current smoking habits Pt. has never smoked.  Ok to leave detailed message on voice mail for today's visit only Yes, phone # [phone]

## 2022-05-15 ENCOUNTER — HEALTH MAINTENANCE LETTER (OUTPATIENT)
Age: 37
End: 2022-05-15

## 2022-09-11 ENCOUNTER — HEALTH MAINTENANCE LETTER (OUTPATIENT)
Age: 37
End: 2022-09-11

## 2022-10-25 ENCOUNTER — OFFICE VISIT (OUTPATIENT)
Dept: FAMILY MEDICINE | Facility: CLINIC | Age: 37
End: 2022-10-25

## 2022-10-25 VITALS
HEART RATE: 60 BPM | WEIGHT: 199 LBS | RESPIRATION RATE: 22 BRPM | DIASTOLIC BLOOD PRESSURE: 78 MMHG | SYSTOLIC BLOOD PRESSURE: 110 MMHG | BODY MASS INDEX: 26.99 KG/M2 | TEMPERATURE: 97 F | OXYGEN SATURATION: 98 %

## 2022-10-25 DIAGNOSIS — M79.671 RIGHT FOOT PAIN: Primary | ICD-10-CM

## 2022-10-25 PROCEDURE — 73630 X-RAY EXAM OF FOOT: CPT | Mod: RT | Performed by: STUDENT IN AN ORGANIZED HEALTH CARE EDUCATION/TRAINING PROGRAM

## 2022-10-25 PROCEDURE — 99213 OFFICE O/P EST LOW 20 MIN: CPT | Performed by: STUDENT IN AN ORGANIZED HEALTH CARE EDUCATION/TRAINING PROGRAM

## 2022-10-25 NOTE — NURSING NOTE
Chief Complaint   Patient presents with     Foot Pain     Foot pain on top middle of right foot, noticed a couple of days ago, no known injury but happened while playing tennis     Pre-visit Screening:  Immunizations:  up to date  Colonoscopy:  NA  Mammogram: NA  Asthma Action Test/Plan:  NA  PHQ9:  NA  GAD7:  NA  Questioned patient about current smoking habits Pt. has never smoked.  Ok to leave detailed message on voice mail for today's visit only Yes, phone # [phone]

## 2022-10-25 NOTE — PROGRESS NOTES
ASSESSMENT & PLAN      ICD-10-CM    1. Right foot pain  M79.671 X-ray rt Foot G/E 3 vws*         XRs obtained and reviewed with patient, unremarkable. Discussed possibility of early bone stress injury vs overuse tendinitis. Discussed nature, risks, and tx of each. Agreed reasonable to participate in upcoming tournament as tolerated. Consider PT if not resolving with next 2-3 wks. Follow-up sooner if any new or worsening sx.    Patient Instructions   Activity as tolerated to avoid pain    Let me know how tournament goes or if not getting better     >20 minutes spent on the date of the encounter doing chart review, history and exam, documentation and further activities per the note.    Cooper Elizalde MD, [facility]      -----    SUBJECTIVE  Zach Little is a/an 36 year old male who is seen for evaluation of     Chief Complaint   Patient presents with     Foot Pain     Foot pain on top middle of right foot, noticed a couple of days ago, no known injury but happened while playing tennis     The patient is seen by themselves.    Was playing significantly more tennis the past week but no acute injury.  No swelling or bruising.    Location of Pain: right arch  Worsened by: activity  Better with: unsure  Treatments tried: no treatment tried to date  Associated symptoms: no distal numbness or tingling; denies swelling or warmth    Patient's PMH, PSH, and family hx reviewed.      OBJECTIVE:  /78 (BP Location: Left arm, Patient Position: Sitting, Cuff Size: Adult Large)   Pulse 60   Temp 97  F (36.1  C) (Temporal)   Resp 22   Wt 90.3 kg (199 lb)   SpO2 98%   BMI 26.99 kg/m     Alert, NAD  NC/AT  Sclerae anicteric  Regular  Resp nonlabored  Skin warm and dry  No focal neuro deficits. Speech intact.     R foot wo deformity, skin unremarkable  No focal  Swelling, bruising or TTP on exam today  5/5 str throughout foot and ankle  SILT  2+ DP pulse    RADIOLOGY:  Results for orders placed or  performed in visit on 10/25/22   X-ray rt Foot G/E 3 vws*     Status: None    Narrative    Radiologist Consultation/:   Fax:  [phone]  [phone]  _____________________________________________________________________________________________________________________________________________________________________________________________________________________________________________________________________________________________________________________________________________________________________________________________________________________________________________________________________________________________________  PATIENT NAME: ANDI DUPONT  YOB: 1985 Age: 37 ACCESSION NUMBER: [id number]  SEX: M ORDERING PROVIDER: Cooper Elizalde  FACILITY: [facility] PRIMARY PROVIDER:  PATIENT ID: [id number] INTERESTED PARTY:  Page 1 of 1  _________________________________________________________________________________________________________________________________________________________________________________________________________________________________________________________________________________________________________________________________________________________________________________________  EXAM: XRAY FOOT,MIN 3VW RIGHT  LOCATION: [facility]  DATE/TIME: 10/25/2022 2:22 PM  INDICATION: Right foot pain.  COMPARISON: None.  IMPRESSION: The right foot is negative for fracture.  SIGNED BY: Win Haynes MD 10/26/2022 8:19 AM

## 2022-10-25 NOTE — PATIENT INSTRUCTIONS
Activity as tolerated to avoid pain    Let me know how tournament goes or if not getting better   
no

## 2023-06-03 ENCOUNTER — HEALTH MAINTENANCE LETTER (OUTPATIENT)
Age: 38
End: 2023-06-03

## 2024-06-17 PROBLEM — Z76.89 HEALTH CARE HOME: Status: RESOLVED | Noted: 2018-10-19 | Resolved: 2024-06-17

## 2024-07-13 ENCOUNTER — HEALTH MAINTENANCE LETTER (OUTPATIENT)
Age: 39
End: 2024-07-13

## 2024-08-12 ENCOUNTER — OFFICE VISIT (OUTPATIENT)
Dept: FAMILY MEDICINE | Facility: CLINIC | Age: 39
End: 2024-08-12

## 2024-08-12 VITALS
HEART RATE: 51 BPM | HEIGHT: 72 IN | SYSTOLIC BLOOD PRESSURE: 108 MMHG | TEMPERATURE: 98 F | WEIGHT: 194 LBS | BODY MASS INDEX: 26.28 KG/M2 | DIASTOLIC BLOOD PRESSURE: 62 MMHG | OXYGEN SATURATION: 98 %

## 2024-08-12 DIAGNOSIS — Z13.29 SCREENING FOR ENDOCRINE, METABOLIC AND IMMUNITY DISORDER: ICD-10-CM

## 2024-08-12 DIAGNOSIS — Z11.4 SCREENING FOR HIV (HUMAN IMMUNODEFICIENCY VIRUS): ICD-10-CM

## 2024-08-12 DIAGNOSIS — Z13.220 SCREENING FOR LIPOID DISORDERS: ICD-10-CM

## 2024-08-12 DIAGNOSIS — Z00.00 ROUTINE GENERAL MEDICAL EXAMINATION AT A HEALTH CARE FACILITY: Primary | ICD-10-CM

## 2024-08-12 DIAGNOSIS — Z11.59 ENCOUNTER FOR HEPATITIS C SCREENING TEST FOR LOW RISK PATIENT: ICD-10-CM

## 2024-08-12 DIAGNOSIS — Z13.0 SCREENING FOR ENDOCRINE, METABOLIC AND IMMUNITY DISORDER: ICD-10-CM

## 2024-08-12 DIAGNOSIS — Z13.228 SCREENING FOR ENDOCRINE, METABOLIC AND IMMUNITY DISORDER: ICD-10-CM

## 2024-08-12 LAB
BUN SERPL-MCNC: 19 MG/DL (ref 7–25)
BUN/CREATININE RATIO: 19 (ref 6–32)
CALCIUM SERPL-MCNC: 9.3 MG/DL (ref 8.6–10.3)
CHLORIDE SERPLBLD-SCNC: 104.4 MMOL/L (ref 98–110)
CHOLEST SERPL-MCNC: 181 MG/DL (ref 0–199)
CHOLEST/HDLC SERPL: 3 {RATIO} (ref 0–5)
CO2 SERPL-SCNC: 27.2 MMOL/L (ref 20–32)
CREAT SERPL-MCNC: 1.02 MG/DL (ref 0.6–1.3)
GLUCOSE SERPL-MCNC: 89 MG/DL (ref 60–99)
HDLC SERPL-MCNC: 56 MG/DL (ref 40–150)
LDLC SERPL CALC-MCNC: 116 MG/DL
POTASSIUM SERPL-SCNC: 4.92 MMOL/L (ref 3.5–5.3)
SODIUM SERPL-SCNC: 139.2 MMOL/L (ref 135–146)
TRIGL SERPL-MCNC: 46 MG/DL (ref 0–149)

## 2024-08-12 PROCEDURE — 99395 PREV VISIT EST AGE 18-39: CPT | Performed by: PHYSICIAN ASSISTANT

## 2024-08-12 PROCEDURE — 86803 HEPATITIS C AB TEST: CPT | Mod: 90 | Performed by: PHYSICIAN ASSISTANT

## 2024-08-12 PROCEDURE — 87389 HIV-1 AG W/HIV-1&-2 AB AG IA: CPT | Mod: 90 | Performed by: PHYSICIAN ASSISTANT

## 2024-08-12 PROCEDURE — 36415 COLL VENOUS BLD VENIPUNCTURE: CPT | Performed by: PHYSICIAN ASSISTANT

## 2024-08-12 PROCEDURE — 80048 BASIC METABOLIC PNL TOTAL CA: CPT | Performed by: PHYSICIAN ASSISTANT

## 2024-08-12 PROCEDURE — 80061 LIPID PANEL: CPT | Performed by: PHYSICIAN ASSISTANT

## 2024-08-12 NOTE — PROGRESS NOTES
"Preventive Care Visit  [facility]  Farhan Ladd PA-C, Family Medicine  Aug 12, 2024      SUBJECTIVE:   Zach is a 38 year old, presenting for the following:  Physical (Annual, fasting, form)      HPI    Diet - not great, not as much fruits and vegetables  Exercise - daily, run, tennis, golf, bike  Sleep - good, 9 hours  BM - regular, 1x/day  Vitamin Use - none right now  Dentist - 2x/year  Eye Doctor - every other year, vision has improved, not needing glasses anymore      Social History     Tobacco Use    Smoking status: Never    Smokeless tobacco: Never   Substance Use Topics    Alcohol use: Yes     Alcohol/week: 3.0 - 5.0 standard drinks of alcohol     Types: 3 - 5 Standard drinks or equivalent per week         Last PSA: No results found for: \"PSA\"    Reviewed orders with patient. Reviewed health maintenance and updated orders accordingly - Yes    Reviewed and updated as needed this visit by clinical staff   Tobacco  Allergies  Meds   Med Hx  Surg Hx  Fam Hx          Reviewed and updated as needed this visit by Provider       Med Hx  Surg Hx  Fam Hx   Sexual Activity        Past Medical History:   Diagnosis Date    Broken collarbone     Torn Achilles tendon     partial      Past Surgical History:   Procedure Laterality Date    DENTAL SURGERY  2014    apicoectomy - root canal     FOOT TENDON SURGERY Left 2021         Review of Systems  Constitutional, neuro, ENT, endocrine, pulmonary, cardiac, gastrointestinal, genitourinary, musculoskeletal, integument and psychiatric systems are negative, except as otherwise noted.    OBJECTIVE:   /62 (BP Location: Right arm, Patient Position: Sitting, Cuff Size: Adult Large)   Pulse 51   Temp 98  F (36.7  C) (Temporal)   Ht 1.835 m (6' 0.25\")   Wt 88 kg (194 lb)   SpO2 98%   BMI 26.13 kg/m     Estimated body mass index is 26.13 kg/m  as calculated from the following:    Height as of this encounter: 1.835 m (6' 0.25\").    Weight as of " this encounter: 88 kg (194 lb).  Physical Exam  GENERAL: alert and no distress  EYES: Eyes grossly normal to inspection, PERRL and conjunctivae and sclerae normal  HENT: ear canals and TM's normal, nose and mouth without ulcers or lesions  NECK: no adenopathy, no asymmetry, masses, or scars  RESP: lungs clear to auscultation - no rales, rhonchi or wheezes  CV: regular rate and rhythm, normal S1 S2, no S3 or S4, no murmur, click or rub, no peripheral edema  ABDOMEN: soft, nontender, no hepatosplenomegaly, no masses and bowel sounds normal  MS: no gross musculoskeletal defects noted, no edema  NEURO: Normal strength and tone, mentation intact and speech normal  PSYCH: mentation appears normal, affect normal/bright    Diagnostic Test Results:  Labs reviewed in Epic  No results found for this or any previous visit (from the past 24 hour(s)).    ASSESSMENT/PLAN:   Routine general medical examination at a health care facility  - Lipid Panel ([facility])  - Basic Metabolic Panel ([facility])  - HEPATITIS C ANTIBODY ([facility])  - VENOUS COLLECTION  - HIV 1/2 Agn Rosa 4th Gen w Reflex ([facility])    Screening for HIV (human immunodeficiency virus)  - VENOUS COLLECTION  - HIV 1/2 Agn Rosa 4th Gen w Reflex ([facility])    Screening for endocrine, metabolic and immunity disorder  - Basic Metabolic Panel ([facility])  - VENOUS COLLECTION    Screening for lipoid disorders  - Lipid Panel ([facility])  - VENOUS COLLECTION    Encounter for hepatitis C screening test for low risk patient  - HEPATITIS C ANTIBODY ([facility])  - VENOUS COLLECTION      Patient has been advised of split billing requirements and indicates understanding: Yes      Counseling  Reviewed preventive health counseling, as reflected in patient instructions       Regular exercise       Healthy diet/nutrition        He reports that he has never smoked. He has never used smokeless tobacco.            Signed Electronically by: Farhan Ladd PA-C

## 2024-08-12 NOTE — NURSING NOTE
Chief Complaint   Patient presents with    Physical     Annual, fasting, form     Pre-visit Screening:  Immunizations:  up to date  Colonoscopy:  na  Mammogram: na  Asthma Action Test/Plan:  na  PHQ9:  phq 2 given  GAD7:  na  Questioned patient about current smoking habits Pt. has never smoked.  Ok to leave detailed message on voice mail for today's visit only yes, phone # [phone] (home) [phone] (work)

## 2024-08-13 LAB
HCV AB - QUEST: NORMAL
HIV 1/2 AGN ABY 4TH GEN WITH REFLEX: NORMAL